# Patient Record
Sex: MALE | Race: OTHER | Employment: OTHER | URBAN - NONMETROPOLITAN AREA
[De-identification: names, ages, dates, MRNs, and addresses within clinical notes are randomized per-mention and may not be internally consistent; named-entity substitution may affect disease eponyms.]

---

## 2023-01-11 NOTE — PATIENT DISCUSSION
PATIENT STATES HE HAS HAD A FEW EPISODES THAT, AFTER REVIEWING WITH DR. GRIGGS, SOUND IN LINE WITH OCULAR MIGRAINES. WILL SEND REFERRAL BACK TO DR. Nanci Ayala FOR VASCULAR WORK UP TO ENSURE EVERYTHING LOOKS AS IT SHOULD THERE.

## 2023-07-19 ENCOUNTER — COMPREHENSIVE EXAM (OUTPATIENT)
Facility: LOCATION | Age: 73
End: 2023-07-19

## 2023-07-19 DIAGNOSIS — H52.03: ICD-10-CM

## 2023-07-19 DIAGNOSIS — H35.373: ICD-10-CM

## 2023-07-19 DIAGNOSIS — H25.813: ICD-10-CM

## 2023-07-19 DIAGNOSIS — E11.9: ICD-10-CM

## 2023-07-19 DIAGNOSIS — H40.033: ICD-10-CM

## 2023-07-19 PROCEDURE — 92134 CPTRZ OPH DX IMG PST SGM RTA: CPT

## 2023-07-19 PROCEDURE — 92015 DETERMINE REFRACTIVE STATE: CPT

## 2023-07-19 PROCEDURE — 92014 COMPRE OPH EXAM EST PT 1/>: CPT

## 2023-07-19 PROCEDURE — 92310B CONTACT LEN 60

## 2023-07-19 ASSESSMENT — KERATOMETRY
OS_K2POWER_DIOPTERS: 42.25
OD_K2POWER_DIOPTERS: 42.00
OS_AXISANGLE_DEGREES: 51
OD_K1POWER_DIOPTERS: 41.75
OS_K1POWER_DIOPTERS: 42.00
OS_AXISANGLE2_DEGREES: 141
OD_AXISANGLE_DEGREES: 2
OD_AXISANGLE2_DEGREES: 92

## 2023-07-19 ASSESSMENT — VISUAL ACUITY
OS_CC: 20/100-1
OS_CC: J2
OD_CC: J2
OD_CC: 20/40+2
OS_PH: 20/60+2

## 2024-07-29 ENCOUNTER — COMPREHENSIVE EXAM (OUTPATIENT)
Facility: LOCATION | Age: 74
End: 2024-07-29

## 2024-07-29 DIAGNOSIS — H40.033: ICD-10-CM

## 2024-07-29 DIAGNOSIS — E11.9: ICD-10-CM

## 2024-07-29 DIAGNOSIS — H35.373: ICD-10-CM

## 2024-07-29 DIAGNOSIS — H25.813: ICD-10-CM

## 2024-07-29 DIAGNOSIS — H52.03: ICD-10-CM

## 2024-07-29 DIAGNOSIS — H02.831: ICD-10-CM

## 2024-07-29 DIAGNOSIS — H02.834: ICD-10-CM

## 2024-07-29 PROCEDURE — 92310B CONTACT LEN 60

## 2024-07-29 PROCEDURE — 92015 DETERMINE REFRACTIVE STATE: CPT

## 2024-07-29 PROCEDURE — 92134 CPTRZ OPH DX IMG PST SGM RTA: CPT

## 2024-07-29 PROCEDURE — 92014 COMPRE OPH EXAM EST PT 1/>: CPT

## 2024-07-29 ASSESSMENT — VISUAL ACUITY
OS_SC: 20/80-2
OD_SC: 20/150-2

## 2024-07-29 ASSESSMENT — TONOMETRY
OD_IOP_MMHG: 14
OS_IOP_MMHG: 16

## 2024-07-29 ASSESSMENT — KERATOMETRY
OD_AXISANGLE2_DEGREES: 31
OS_K1POWER_DIOPTERS: 42.25
OD_AXISANGLE_DEGREES: 121
OS_AXISANGLE2_DEGREES: 175
OD_K1POWER_DIOPTERS: 42.00
OS_AXISANGLE_DEGREES: 85
OS_K2POWER_DIOPTERS: 42.50
OD_K2POWER_DIOPTERS: 42.25

## 2025-07-30 ENCOUNTER — COMPREHENSIVE EXAM (OUTPATIENT)
Age: 75
End: 2025-07-30

## 2025-07-30 DIAGNOSIS — E11.9: ICD-10-CM

## 2025-07-30 DIAGNOSIS — H02.834: ICD-10-CM

## 2025-07-30 DIAGNOSIS — H35.373: ICD-10-CM

## 2025-07-30 DIAGNOSIS — H02.831: ICD-10-CM

## 2025-07-30 DIAGNOSIS — H40.033: ICD-10-CM

## 2025-07-30 DIAGNOSIS — H25.813: ICD-10-CM

## 2025-07-30 PROCEDURE — 92014 COMPRE OPH EXAM EST PT 1/>: CPT

## 2025-07-30 PROCEDURE — 92134 CPTRZ OPH DX IMG PST SGM RTA: CPT
